# Patient Record
Sex: FEMALE | Race: BLACK OR AFRICAN AMERICAN | Employment: UNEMPLOYED | URBAN - METROPOLITAN AREA
[De-identification: names, ages, dates, MRNs, and addresses within clinical notes are randomized per-mention and may not be internally consistent; named-entity substitution may affect disease eponyms.]

---

## 2019-11-12 ENCOUNTER — OFFICE VISIT (OUTPATIENT)
Dept: ORTHOPEDIC SURGERY | Age: 54
End: 2019-11-12

## 2019-11-12 VITALS
OXYGEN SATURATION: 97 % | TEMPERATURE: 98.2 F | HEIGHT: 70 IN | SYSTOLIC BLOOD PRESSURE: 123 MMHG | WEIGHT: 193 LBS | DIASTOLIC BLOOD PRESSURE: 86 MMHG | RESPIRATION RATE: 16 BRPM | BODY MASS INDEX: 27.63 KG/M2 | HEART RATE: 66 BPM

## 2019-11-12 DIAGNOSIS — G89.29 CHRONIC RIGHT-SIDED LOW BACK PAIN WITH RIGHT-SIDED SCIATICA: Primary | ICD-10-CM

## 2019-11-12 DIAGNOSIS — M54.41 CHRONIC RIGHT-SIDED LOW BACK PAIN WITH RIGHT-SIDED SCIATICA: Primary | ICD-10-CM

## 2019-11-12 RX ORDER — METHYLPREDNISOLONE 4 MG/1
TABLET ORAL
Qty: 1 DOSE PACK | Refills: 0 | Status: SHIPPED | OUTPATIENT
Start: 2019-11-12 | End: 2019-11-12 | Stop reason: SDUPTHER

## 2019-11-12 RX ORDER — GUAIFENESIN 100 MG/5ML
81 LIQUID (ML) ORAL DAILY
COMMUNITY

## 2019-11-12 RX ORDER — MELOXICAM 15 MG/1
15 TABLET ORAL DAILY
Qty: 30 TAB | Refills: 1 | Status: SHIPPED | OUTPATIENT
Start: 2019-11-12

## 2019-11-12 RX ORDER — METHYLPREDNISOLONE 4 MG/1
TABLET ORAL
Qty: 1 DOSE PACK | Refills: 0 | Status: SHIPPED | OUTPATIENT
Start: 2019-11-12

## 2019-11-12 NOTE — PROGRESS NOTES
Chief complaint/History of Present Illness:  Chief Complaint   Patient presents with    Back Pain     lower back pain, pain down legs     HPI  Tiffany Mike is a  53 y.o.  female      HISTORY OF PRESENT ILLNESS:  The patient comes in today as a new patient referred by her PCP, Dr. Andre Arriola. She comes in today with low back pain with radiating right lower extremity pain from her buttock down to her knee for the past six months. She denies any injury; however, in 2008, she had a left tibial femur fracture and had an ORIF. She still has pain from that in that leg. It causes her to walk a little bit off, and she feels that has contributed to her back pain and right lower extremity pain. the pain has progressively gotten worse over the past six months. Her PCP did an x-ray. It showed a slight scoliotic curve. That was June 17, 2019. She states he also ordered an MRI, and she went to have it done, and but she found out she was claustrophobic. This was in October. She was unable to complete it. Her PCP has her on Gabapentin 300 mg twice a day and Tylenol No. 3 as needed for pain. She states her pain is increased with standing, lying, walking, lifting, and bending forward. She gets numbness in that right thigh. She has not tried any physical therapy or NSAIDs. PAST MEDICAL HISTORY:  Hyperlipidemia. PAST SURGICAL HISTORY:  Left ORIF lower extremity. She denies fever and bowel and bladder dysfunction. She rates her pain as a 6-8/10. She does not work. She is a nonsmoker. PHYSICAL EXAM:  Ms. Elbert Tello is a 76-year-old female. She is alert and oriented. She has a normal mood and affect. She has a full weightbearing, slightly antalgic gait using no assistive device. She has 4/5 strength of the bilateral lower extremities and negative straight leg raise. She has pain with hyperextension of the lumbar spine.        ASSESSMENT/PLAN:  This is a patient who has low back pain with radiating right buttock and leg pain down to her knee for the past six months. We are going to put her in some physical therapy. She is going to call and try to get her MRI rescheduled. I suggested that she get a Valium from her PCP prior to her MRI. If she is able to get the MRI completed, she can call up here, and we will have her followup with one of our physicians. If not, she can finish up the physical therapy and then we can order an MRI upon completion of that if it is still warranted. I have given her a Medrol Dosepak to take with food. I am also going to start her on Mobic 15 mg daily once she completes the Medrol Dosepak, if she still needs it for pain. We will see her back in two months or sooner if needed. Review of systems:    Past Medical History:   Diagnosis Date    GERD (gastroesophageal reflux disease)      Past Surgical History:   Procedure Laterality Date    HX ENDOSCOPY      HX ORTHOPAEDIC Left 2008    leg fx.      Social History     Socioeconomic History    Marital status: UNKNOWN     Spouse name: Not on file    Number of children: Not on file    Years of education: Not on file    Highest education level: Not on file   Occupational History    Not on file   Social Needs    Financial resource strain: Not on file    Food insecurity:     Worry: Not on file     Inability: Not on file    Transportation needs:     Medical: Not on file     Non-medical: Not on file   Tobacco Use    Smoking status: Never Smoker    Smokeless tobacco: Never Used   Substance and Sexual Activity    Alcohol use: Yes     Comment: occassionally    Drug use: No    Sexual activity: Not on file   Lifestyle    Physical activity:     Days per week: Not on file     Minutes per session: Not on file    Stress: Not on file   Relationships    Social connections:     Talks on phone: Not on file     Gets together: Not on file     Attends [employer] service: Not on file     Active member of club or organization: Not on file Attends meetings of clubs or organizations: Not on file     Relationship status: Not on file    Intimate partner violence:     Fear of current or ex partner: Not on file     Emotionally abused: Not on file     Physically abused: Not on file     Forced sexual activity: Not on file   Other Topics Concern    Not on file   Social History Narrative    Not on file     No family history on file. Physical Exam:  Visit Vitals  /86   Pulse 66   Temp 98.2 °F (36.8 °C) (Oral)   Resp 16   Ht 5' 10\" (1.778 m)   Wt 193 lb (87.5 kg)   SpO2 97%   BMI 27.69 kg/m²     Pain Scale: 7/10       has been . reviewed and is appropriate          Diagnoses and all orders for this visit:    1. Chronic right-sided low back pain with right-sided sciatica  -     meloxicam (MOBIC) 15 mg tablet; Take 1 Tab by mouth daily. Indications: joint damage causing pain and loss of function  -     REFERRAL TO PHYSICAL THERAPY  -     methylPREDNISolone (MEDROL, AMY,) 4 mg tablet; Per dose pack instructions            Follow-up and Dispositions    · Return in about 2 months (around 1/12/2020) for with NP.              We have informed Ophelia Shaikh to notify us for immediate appointment if she has any worsening neurogical symptoms or if an emergency situation presents, then call [phone]

## 2019-11-15 ENCOUNTER — HOSPITAL ENCOUNTER (OUTPATIENT)
Dept: PHYSICAL THERAPY | Age: 54
Discharge: HOME | End: 2019-11-15
Payer: MEDICAID

## 2019-11-15 PROCEDURE — 97162 PT EVAL MOD COMPLEX 30 MIN: CPT

## 2019-11-15 PROCEDURE — 97140 MANUAL THERAPY 1/> REGIONS: CPT

## 2019-11-15 PROCEDURE — 97110 THERAPEUTIC EXERCISES: CPT

## 2019-11-15 NOTE — PROGRESS NOTES
[facility]  [address] [facility], [address] [place] [address], [facility] [address]  [phone] fax      Plan of Care/ Statement of Necessity for Physical Therapy Services    Patient name: Deanna Stone Start of Care: 11/15/2019   Referral source: José Ramon NP : 1965    Medical Diagnosis: Lumbago with sciatica, right side [M54.41]  Payor: Amauri Mcdonald / Plan: Maritza Harada / Product Type: Managed Care Medicaid /  Onset Date: May 2019     Treatment Diagnosis: Low back and right LE pain    Prior Hospitalization: see medical history Provider#: [id number]   Medications: Verified on Patient summary List    Comorbidities: Arthritis, back pain, BMI over 30, HTN, 2008- Surgery due to left fractured femur with plates/screws   Prior Level of Function: Patient was able to navigate stairs with a reciprocal gait pattern without pain. Patient is ind with food prep and household management. The Plan of Care and following information is based on the information from the initial evaluation. Assessment/ key information: Patient presents with LBP that began May 2019 with an insidious onset. Patient thinks the onset of LBP began due to extensive walking on vacation in 2019. Patient has issues in her left leg (2008- fractured femur with plate/screws) and thinks that back pain began from altered mechanics during ambulation. Patient describes LBP as intermittent throbbing on right side of low back. Patient describes (B) LE pain as constant radiating/stinging/susan horse pain along anterior thigh to knee. Patient reports occasional tingling/numbness in (B) Anterior thigh, especially when patient is standing for a prolonged period of time. Patient is challenged with transferring after prolonged sitting, bed mobility, standing/walking 10-15 minutes, and sitting (approx 10 minutes).  Patient exhibits decreased core stabilization, poor glut activation, decreased functional mobility, and decreased hip strength. Patient had a negative slump and SLR test on the right. Patient is lacking 10 deg of knee extension (patient stated it has been like that since injury in 2008), and reports discomfort in right anterior thigh with quad activation. Patient has increased tenderness/tightness along right QL/ITB and reported numbness at right anterior thigh during palpation. Patient demonstrates potential to make functional gains within a reasonable time frame. Patient would benefit from skilled PT to address above deficits and assist with return to PLOF. Evaluation Complexity History MEDIUM  Complexity : 1-2 comorbidities / personal factors will impact the outcome/ POC ; Examination MEDIUM Complexity : 3 Standardized tests and measures addressing body structure, function, activity limitation and / or participation in recreation  ;Presentation MEDIUM Complexity : Evolving with changing characteristics  ; Clinical Decision Making MEDIUM Complexity : FOTO score of 26-74  Overall Complexity Rating: MEDIUM  Problem List: pain affecting function, decrease ROM, decrease strength, impaired gait/ balance, decrease ADL/ functional abilitiies, decrease activity tolerance, decrease flexibility/ joint mobility and decrease transfer abilities   Treatment Plan may include any combination of the following: Therapeutic exercise, Therapeutic activities, Neuromuscular re-education, Physical agent/modality, Gait/balance training, Manual therapy, Patient education, Functional mobility training and Stair training  Patient / Family readiness to learn indicated by: asking questions, trying to perform skills and interest  Persons(s) to be included in education: patient (P)  Barriers to Learning/Limitations: None  Patient Goal (s): some relief  Patient Self Reported Health Status: fair  Rehabilitation Potential: good    Short Term Goals: To be accomplished in 5 treatments:   1.  Patient will be ind and compliant with HEP 1-2x/day to increase ease with ADLs. Eval: HEP established    2. Patient will be able to perform 5 bridges with buttock clearance to increase ease with bed mobility   Eval: Patient unable to clear buttock with bridges. Long Term Goals: To be accomplished in 10 weeks:   1. Patient will improve FOTO to at least 67 to demonstrate improved function and increase quality of life. Eval: FOTO: 58   2. Patient will be able to perform 5 sit to stand transfers without UE support with pain no more then 2/10 to increase ease with car transfers. Eval:  Patient reports increased pain and difficulty with sit to stand transfers. 3. Patient will be able to stand/ambulate for 20 minutes to increase ease with grocery shopping. Eval:  Patient is challenged with standing/walking 10-15 minutes  Frequency / Duration: Patient to be seen 2 times per week for 10 treatments. Patient/ Caregiver education and instruction: Diagnosis, prognosis, exercises   [x]  Plan of care has been reviewed with MARILIA Landers, PT 11/15/2019 1:36 PM  ________________________________________________________________________    I certify that the above Therapy Services are being furnished while the patient is under my care. I agree with the treatment plan and certify that this therapy is necessary.     Physician's Signature:____________Date:_________TIME:________    [employer], Date and Time must be completed for valid certification **      Please sign and return to In . [address], [address] [place] [address], [address] [place] [address],[facility] [address]  [phone] fax

## 2019-11-15 NOTE — PROGRESS NOTES
PT DAILY TREATMENT NOTE/LUMBAR EVAL 10-18    Patient Name: Maya Swain  Date:11/15/2019  : 1965  [x]  Patient  Verified  Payor: Emmett Burn / Plan: [employer] / Product Type: Managed Care Medicaid /    In time:12:18  Out time:1:10  Total Treatment Time (min): 52  Visit #: 1 of 10    Treatment Area: Lumbago with sciatica, right side [M54.41]  SUBJECTIVE  Pain Level (0-10 scale): 7/10   []constant []intermittent []improving []worsening []no change since onset    Any medication changes, allergies to medications, adverse drug reactions, diagnosis change, or new procedure performed?: [x] No    [] Yes (see summary sheet for update)  Subjective functional status/changes:     PLOF: Patient was able to navigate stairs with a reciprocal gait pattern without pain. Patient is ind with food prep and household management. Limitations to PLOF: pain, limited mobility, decreased strength   Mechanism of Injury: Patient presents with LBP that began May 2019 with an insidious onset. Patient thinks the onset of LBP began due to doing extensive walking on vacation in 2019. Patient has issues in her left leg (2008- fractured femur with plate/screws) and thinks that back pain began from altered mechanics during ambulation. Current symptoms/Complaints: Patient describes LBP as intermittent throbbing on right side of low back. Patient describes (B) LE pain as constant radiating/stinging/susan horse pain along anterior thigh to knee. Patient reports occasional tingling/numbness in (B) Anterior thigh, especially when patient is standing for a prolonged period of time. Patient is challenged with transferring after prolong sitting, bed mobility, standing/walking 10-15 minutes, and sitting (approx 10 minutes). Previous Treatment/Compliance: Previous PT for left leg  PMHx/Surgical Hx: No previous back/hip/ankle/foot surg. No pacemaker no cardiac issues, no CA.   Work Hx: n/a  Living Situation: Stairs in home with rails. Uses   Pt Goals: \"some relief\"   Cognition: A & O x 2    Other:    OBJECTIVE/EXAMINATION    29 min [x]Eval                  []Re-Eval       15 min Therapeutic Exercise:  [x] See flow sheet : HEP creation and review    Rationale: increase ROM and increase strength to improve the patients ability to perform ADls safely    8 min Manual Therapy:  STM to Right QL and IASTM to right ITB    Rationale: decrease pain, increase ROM and increase tissue extensibility to reduce myofascial restriction and assist with return to proper length-tension of musculature to improve muscle activation and increase ease with ADLs. With   [] TE   [] TA   [] neuro   [] other: Patient Education: [x] Review HEP    [] Progressed/Changed HEP based on:   [] positioning   [] body mechanics   [] transfers   [] heat/ice application    [] other:      Other Objective/Functional Measures:     Physical Therapy Evaluation - Lumbar Spine ([facility])    General Health:  Red Flags Indicated? [] Yes    [] No  [] Yes [] No Recent weight change (If yes, due to dieting? [] Yes  [] No)   [] Yes [] No Weakness in legs during walking  [] Yes [] No Unremitting pain at night  [] Yes [] No Abdominal pain or problems  [] Yes [] No Rectal bleeding  [] Yes [] No Feet more cold or painful in cold weather  [] Yes [] No Menstrual irregularities  [] Yes [] No Blood or pain with urination  [] Yes [x] No Dysfunction of bowel or bladder  [] Yes [] No Recent illness within past 3 weeks (i.e, cold, flu)  [] Yes [] No Numbness/tingling in buttock/genitalia region    Past History/Treatments:     OBJECTIVE  Posture:  Latera trunk lean toward the right: [x] R    [] L     [] +  [] -  Kyphosis: [x] Increased [] Decreased   []  WNL  Lordosis:  [x] Increased [] Decreased   [] WNL  Pelvic symmetry: [] WNL    [] Other: unable to properly assist due to elevated pain     Gait:  [] Normal     [x] Abnormal: mild antalgic.  Patient lacking 10 deg of knee extension on the left. Patient stated its been like that since 2008. Active Movements: [] N/A   [] Too acute   [] Other:  ROM % AROM % PROM Comments:pain, area   Forward flexion 40-60 WFL  Utilized hands on legs to return to standing position    Extension 20-30 WFL     SB right 20-30 WFL     SB left 20-30 WFL     Rotation right 5-10 WFL     Rotation left 5-10 WFL       Dural Mobility:  SLR Sitting: [] R    [] L    [] +    [] -  @ (degrees):           Supine: [x] R    [x] L    [] +    [x] -  @ (degrees):   Slump Test: [x] R    [x] L    [] +    [x] -  @ (degrees):   Prone Knee Bend: [] R    [] L    [] +    [] -     Palpation  [] Min  [] Mod  [] Severe    Location: TTP at right QL and ITB and along anterior thigh. Patient reported numbness sensation from superior thigh to knee. [] Min  [] Mod  [] Severe    Location:  [] Min  [] Mod  [] Severe    Location:    Strength   L(0-5) R (0-5) N/T   Hip Flexion (L1,2) 3 3 []   Knee Extension (L3,4) 4- 4- []   Ankle Dorsiflexion (L4)   []   Great Toe Extension (L5)   []   Ankle Plantarflexion (S1)   []   Knee Flexion (S1,2) 4 4 []   Upper Abdominals   []   Lower Abdominals   []   Paraspinals   []   Back Rotators   []   Gluteus Toño   []   Other: Glut med 3- 3- []         Global Muscular Weakness:  Abdominals: poor    Other tests/comments:   Patient unable to perform a supine bridge with buttock clearance  Patient had increased difficulty performing bed mobility and reported a 7/10 pain  Patient was unable to tolerate prone position today. Unable to accurately assess pelvic alignment due to elevated pain  Patient reported increased pain at right lumbar region with passive SKTC at approximately 80deg of hip flexion. Patient reports anterior thigh discomfort with quad activation. Pain Level (0-10 scale) post treatment: 6/10    ASSESSMENT/Changes in Function: See POC. Patient reported a slight decrease in pain at end of the session.  When on the mat table patient indicated that the right LE symptoms and right sided low back pain was worse, but then when patient returned to a standing position stated her left anterior thigh pain was elevated. Therapist advised patient to perform HEP gently and to stop if pain increases. Patient will continue to benefit from skilled PT services to modify and progress therapeutic interventions, address functional mobility deficits, address ROM deficits, address strength deficits, analyze and address soft tissue restrictions, analyze and cue movement patterns, assess and modify postural abnormalities and address imbalance/dizziness to attain remaining goals.      [x]  See Plan of Care  []  See progress note/recertification  []  See Discharge Summary         Progress towards goals / Updated goals:  See POC    PLAN  []  Upgrade activities as tolerated     [x]  Continue plan of care  []  Update interventions per flow sheet       []  Discharge due to:_  []  Other:_      Naa Herrera, PT 11/15/2019  12:22 PM

## 2019-11-18 ENCOUNTER — HOSPITAL ENCOUNTER (OUTPATIENT)
Dept: PHYSICAL THERAPY | Age: 54
Discharge: HOME | End: 2019-11-18
Payer: MEDICAID

## 2019-11-18 PROCEDURE — 97110 THERAPEUTIC EXERCISES: CPT

## 2019-11-18 PROCEDURE — 97530 THERAPEUTIC ACTIVITIES: CPT

## 2019-11-18 NOTE — PROGRESS NOTES
PT DAILY TREATMENT NOTE 10-18    Patient Name: Roseanne Galicia  Date:2019  : 1965  [x]  Patient  Verified  Payor: Marie Parkinson / [place] [employer] / Product Type: Managed Care Medicaid /    In time:1044  Out time:1133  Total Treatment Time (min): 49  Visit #: 2 of 10    Medicare/BCBS Only   Total Timed Codes (min):  39 1:1 Treatment Time:  26       Treatment Area: Lumbago with sciatica, right side [M54.41]    SUBJECTIVE  Pain Level (0-10 scale): 6  Any medication changes, allergies to medications, adverse drug reactions, diagnosis change, or new procedure performed?: [x] No    [] Yes (see summary sheet for update)  Subjective functional status/changes:   [] No changes reported  Pt reports she has been doing HEP. OBJECTIVE    Modality rationale: decrease pain and increase tissue extensibility to improve the patients ability to complete functional task.    Min Type Additional Details    [] Estim:  []Unatt       []IFC  []Premod                        []Other:  []w/ice   []w/heat  Position:  Location:    [] Estim: []Att    []TENS instruct  []NMES                    []Other:  []w/US   []w/ice   []w/heat  Position:  Location:    []  Traction: [] Cervical       []Lumbar                       [] Prone          []Supine                       []Intermittent   []Continuous Lbs:  [] before manual  [] after manual    []  Ultrasound: []Continuous   [] Pulsed                           []1MHz   []3MHz W/cm2:  Location:    []  Iontophoresis with dexamethasone         Location: [] Take home patch   [] In clinic   10 []  Ice     [x]  heat  []  Ice massage  []  Laser   []  Anodyne Position: semi reclined  Location: right knee    []  Laser with stim  []  Other:  Position:  Location:    []  Vasopneumatic Device Pressure:       [] lo [] med [] hi   Temperature: [] lo [] med [] hi   [x] Skin assessment post-treatment:  [x]intact []redness- no adverse reaction    []redness  adverse reaction:      min []Eval []Re-Eval       30 min Therapeutic Exercise:  [x] See flow sheet :   Rationale: increase ROM, increase strength, improve coordination and increase proprioception to improve the patients ability to complete functional activity. 9 min Therapeutic Activity:  [x]  See flow sheet :   Rationale: increase ROM, increase strength, improve coordination and increase proprioception  to improve the patients ability to complete self care independently. With   [x] TE   [x] TA   [] neuro   [] other: Patient Education: [x] Review HEP    [] Progressed/Changed HEP based on:   [] positioning   [] body mechanics   [] transfers   [] heat/ice application    [] other:      Other Objective/Functional Measures:      Pain Level (0-10 scale) post treatment: 5    ASSESSMENT/Changes in Function: Pt completed 5' on bike to improve cardiovascular endurance and LE strength to increase abilities to complete functional task. Session focused on improving LE/core strength with good return demo. Tech helped to supervised some of therex. Verbal cues and demonstration required to use correct body mechanics. Encouraged pt to complete HEP daily to help to continue to progress PT interventions to improve patient's ability to complete functional and community task independently. At the conclusion of the session, pt reported decreased pain. Patient will continue to benefit from skilled PT services to modify and progress therapeutic interventions, address functional mobility deficits, address ROM deficits, address strength deficits, analyze and address soft tissue restrictions, analyze and cue movement patterns, analyze and modify body mechanics/ergonomics, assess and modify postural abnormalities and address imbalance/dizziness to attain remaining goals. [x]  See Plan of Care  []  See progress note/recertification  []  See Discharge Summary         Progress towards goals / Updated goals:  Short Term Goals:  To be accomplished in 5 treatments:               1. Patient will be ind and compliant with HEP 1-2x/day to increase ease with ADLs. Eval: HEP established     Current: completing 11/18               2. Patient will be able to perform 5 bridges with buttock clearance to increase ease with bed mobility               Eval: Patient unable to clear buttock with bridges. CURRENT: set 1: 4x, set 2: 5x MET 11/18  Long Term Goals: To be accomplished in 10 weeks:               1. Patient will improve FOTO to at least 67 to demonstrate improved function and increase quality of life. Eval: FOTO: 58               2. Patient will be able to perform 5 sit to stand transfers without UE support with pain no more then 2/10 to increase ease with car transfers. Eval:  Patient reports increased pain and difficulty with sit to stand transfers. 3. Patient will be able to stand/ambulate for 20 minutes to increase ease with grocery shopping.                Eval:  Patient is challenged with standing/walking 10-15 minutes    PLAN  [x]  Upgrade activities as tolerated     [x]  Continue plan of care  [x]  Update interventions per flow sheet       []  Discharge due to:_  []  Other:_      Jad Ambrosio 11/18/2019  10:46 AM    Future Appointments   Date Time Provider Hussein Miranda   11/22/2019  1:00 PM Kenneth Renner [facility]   11/26/2019  8:30 AM Melissa Myrick PTA [facility]   11/27/2019  2:00 PM Sailaja Lopez PT [facility]   12/2/2019  8:30 AM Melissa Myrick PTA [facility]   12/6/2019  9:00 AM Melissa Myrick PTA [facility]   12/9/2019 11:30 AM Sailaja Lopez PT [facility]   12/13/2019 10:00 AM Court Billy [facility]

## 2019-11-22 ENCOUNTER — HOSPITAL ENCOUNTER (OUTPATIENT)
Dept: PHYSICAL THERAPY | Age: 54
Discharge: HOME | End: 2019-11-22
Payer: MEDICAID

## 2019-11-22 PROCEDURE — 97140 MANUAL THERAPY 1/> REGIONS: CPT

## 2019-11-22 PROCEDURE — 97110 THERAPEUTIC EXERCISES: CPT

## 2019-11-22 NOTE — PROGRESS NOTES
PT DAILY TREATMENT NOTE 10-18    Patient Name: Adan Deal  Date:2019  : 1965  [x]  Patient  Verified  Payor: Nirmala Flores / Plan: [place] [employer] / Product Type: Managed Care Medicaid /    In time:1:00  Out time:1:54  Total Treatment Time (min): 47  Visit #: 3 of 10    Treatment Area: Lumbago with sciatica, right side [M54.41]    SUBJECTIVE  Pain Level (0-10 scale): 6  Any medication changes, allergies to medications, adverse drug reactions, diagnosis change, or new procedure performed?: [x] No    [] Yes (see summary sheet for update)  Subjective functional status/changes:   [] No changes reported  Pt reports feeling a little sore in her back after last visit    OBJECTIVE    Modality rationale: decrease pain to improve the patients ability to perform daily tasks   Min Type Additional Details    [] Estim:  []Unatt       []IFC  []Premod                        []Other:  []w/ice   []w/heat  Position:  Location:    [] Estim: []Att    []TENS instruct  []NMES                    []Other:  []w/US   []w/ice   []w/heat  Position:  Location:    []  Traction: [] Cervical       []Lumbar                       [] Prone          []Supine                       []Intermittent   []Continuous Lbs:  [] before manual  [] after manual    []  Ultrasound: []Continuous   [] Pulsed                           []1MHz   []3MHz W/cm2:  Location:    []  Iontophoresis with dexamethasone         Location: [] Take home patch   [] In clinic   10 []  Ice     [x]  heat  []  Ice massage  []  Laser   []  Anodyne Position: seated  Location: low back    []  Laser with stim  []  Other:  Position:  Location:    []  Vasopneumatic Device Pressure:       [] lo [] med [] hi   Temperature: [] lo [] med [] hi   [] Skin assessment post-treatment:  []intact []redness- no adverse reaction    []redness  adverse reaction:       36 min Therapeutic Exercise:  [x] See flow sheet :   Rationale: increase ROM and increase strength to improve the patients ability to perform ADLs      8 min Manual Therapy:  STM to Right QL, IT band, upper glutes, and piriformis   Rationale: decrease pain, increase ROM and increase tissue extensibility to reduce myofascial restriction and assist with return to proper length-tension of musculature to improve muscle activation and increase ease with ADLs. With   [] TE   [] TA   [] neuro   [] other: Patient Education: [x] Review HEP    [] Progressed/Changed HEP based on:   [] positioning   [] body mechanics   [] transfers   [] heat/ice application    [] other:      Other Objective/Functional Measures:    C/o central LBP throughout   Challenged with PPT    Pain Level (0-10 scale) post treatment: 6    ASSESSMENT/Changes in Function: Challenged with maintaining TA activation with PPT and PPT bridges. TTP @ upper glutes, piri, and along IT band. Notes central LBP throughout treatment. Patient will continue to benefit from skilled PT services to modify and progress therapeutic interventions, address functional mobility deficits, address ROM deficits, address strength deficits, analyze and address soft tissue restrictions, analyze and cue movement patterns, analyze and modify body mechanics/ergonomics and assess and modify postural abnormalities to attain remaining goals. []  See Plan of Care  []  See progress note/recertification  []  See Discharge Summary         Progress towards goals / Updated goals:  Short Term Goals: To be accomplished in 5 treatments:               1. Patient will be ind and compliant with HEP 1-2x/day to increase ease with ADLs.              Eval: HEP established                Current: completing 11/18               2. Patient will be able to perform 5 bridges with buttock clearance to increase ease with bed mobility               Eval: Patient unable to clear buttock with bridges.                CURRENT: set 1: 4x, set 2: 5x MET 11/18  Long Term Goals: To be accomplished in 10 weeks:               1.  Patient will improve FOTO to at least 67 to demonstrate improved function and increase quality of life.               Eval: FOTO: 62               2. Patient will be able to perform 5 sit to stand transfers without UE support with pain no more then 2/10 to increase ease with car transfers.                Eval:  Patient reports increased pain and difficulty with sit to stand transfers.               3. Patient will be able to stand/ambulate for 20 minutes to increase ease with grocery shopping.               Eval:  Patient is challenged with standing/walking 10-15 minutes    PLAN  []  Upgrade activities as tolerated     [x]  Continue plan of care  []  Update interventions per flow sheet       []  Discharge due to:_  []  Other:_      Jose R Dural, PTA 11/22/2019  1:02 PM    Future Appointments   Date Time Provider Hussein Miranda   11/26/2019  8:30 AM Brii Mcfadden PTA [facility]   11/27/2019  2:00 PM Penny Dykes, PT [facility]   12/2/2019  8:30 AM Laila Nguyen PTA [facility]   12/6/2019  9:00 AM Brii Mcfadden PTA [facility]   12/9/2019 11:30 AM Kev Gomez, PT [facility]   12/13/2019 10:00 AM Jaskaran Brar [facility]

## 2019-11-26 ENCOUNTER — HOSPITAL ENCOUNTER (OUTPATIENT)
Dept: PHYSICAL THERAPY | Age: 54
Discharge: HOME | End: 2019-11-26
Payer: MEDICAID

## 2019-11-26 PROCEDURE — 97110 THERAPEUTIC EXERCISES: CPT

## 2019-11-26 PROCEDURE — 97140 MANUAL THERAPY 1/> REGIONS: CPT

## 2019-11-26 NOTE — PROGRESS NOTES
PT DAILY TREATMENT NOTE 10-18    Patient Name: Zak Ricketts  Date:2019  : 1965  [x]  Patient  Verified  Payor: Lukas Grimaldo / Plan: [facility] MEDICAID / Product Type: Managed Care Medicaid /    In time: 8:35  Out time:9:30  Total Treatment Time (min): 51  Visit #: 4 of 10    Medicare/BCBS Only   Total Timed Codes (min):   1:1 Treatment Time:         Treatment Area: Lumbago with sciatica, right side [M54.41]    SUBJECTIVE  Pain Level (0-10 scale): 6  Any medication changes, allergies to medications, adverse drug reactions, diagnosis change, or new procedure performed?: [x] No    [] Yes (see summary sheet for update)  Subjective functional status/changes:   [] No changes reported  \"Cramoing  At my leg. \"    OBJECTIVE    Modality rationale: decrease edema, decrease inflammation, decrease pain and increase tissue extensibility to improve the patients ability to perform ADL   Min Type Additional Details    [] Estim:  []Unatt       []IFC  []Premod                        []Other:  []w/ice   []w/heat  Position:  Location:    [] Estim: []Att    []TENS instruct  []NMES                    []Other:  []w/US   []w/ice   []w/heat  Position:  Location:    []  Traction: [] Cervical       []Lumbar                       [] Prone          []Supine                       []Intermittent   []Continuous Lbs:  [] before manual  [] after manual    []  Ultrasound: []Continuous   [] Pulsed                           []1MHz   []3MHz W/cm2:  Location:    []  Iontophoresis with dexamethasone         Location: [] Take home patch   [] In clinic   10 []  Ice     [x]  heat  []  Ice massage  []  Laser   []  Anodyne Position:prone  Location:(B) TSP/LSP    []  Laser with stim  []  Other:  Position:  Location:    []  Vasopneumatic Device Pressure:       [] lo [] med [] hi   Temperature: [] lo [] med [] hi   [x] Skin assessment post-treatment:  [x]intact []redness- no adverse reaction    []redness  adverse reaction:      min []Eval []Re-Eval       33 min Therapeutic Exercise:  [x] See flow sheet :   Rationale: increase ROM and increase strength to improve the patients ability to perform ADL      min Therapeutic Activity:  []  See flow sheet :   Rationale: increase ROM and increase strength  to improve the patients ability to perform ADL       min Neuromuscular Re-education:  []  See flow sheet :   Rationale:   to improve the patients ability to    8 min Manual Therapy:  P/A mob 3 (B) TSP/LSP   Rationale: decrease pain, increase ROM, increase tissue extensibility and decrease edema  to perform ADL      min Gait Training:  ___ feet with ___ device on level surfaces with ___ level of assist   Rationale: With   [x] TE   [] TA   [] neuro   [] other: Patient Education: [x] Review HEP    [] Progressed/Changed HEP based on:   [] positioning   [] body mechanics   [] transfers   [] heat/ice application    [] other:      Other Objective/Functional Measures:     Pain Level (0-10 scale) post treatment: 5    ASSESSMENT/Changes in Function: pt exhibits tightness (B) TSP/LSP. Pt  Was  Able to perform there ex fairly well with mod pain. Discussed with pt on importance of drinking  Water  During the day and  Taking vitamins. Patient will continue to benefit from skilled PT services to address functional mobility deficits, address ROM deficits, address strength deficits, analyze and address soft tissue restrictions and analyze and cue movement patterns to attain remaining goals. [x]  See Plan of Care  []  See progress note/recertification  []  See Discharge Summary         Progress towards goals / Updated goals:  Short Term Goals: To be accomplished in 5 treatments:               1. Patient will be ind and compliant with HEP 1-2x/day to increase ease with ADLs.                Ramona Felix              [id number]: completing 11/18               2. Patient will be able to perform 5 bridges with buttock clearance to increase ease with bed mobility               Eval: Patient unable to clear buttock with bridges.               CURRENT: set 1: 4x, set 2: 5x MET 11/18  Long Term Goals: To be accomplished in 10 weeks:               1. Patient will improve FOTO to at least 67 to demonstrate improved function and increase quality of life.               Eval: FOTO: 62               2. Patient will be able to perform 5 sit to stand transfers without UE support with pain no more then 2/10 to increase ease with car transfers.                Eval:  Patient reports increased pain and difficulty with sit to stand transfers. progressing 11/26/19               3. Patient will be able to stand/ambulate for 20 minutes to increase ease with grocery shopping.               Eval:  Patient is challenged with standing/walking 10-15 minutes    PLAN  []  Upgrade activities as tolerated     []  Continue plan of care  []  Update interventions per flow sheet       []  Discharge due to:_  []  Other:_      Janny Williamson, MARILIA 11/26/2019  8:48 AM    Future Appointments   Date Time Provider Hussein Miranda   11/27/2019  2:00 PM Joanna Ojeda PT [facility]   12/2/2019  8:30 AM Laila Nguyen PTA [facility]   12/6/2019  9:00 AM Laila Nguyen PTA [facility]   12/9/2019 11:30 AM Laila Nguyen PTA [facility]   12/13/2019 10:00 AM Cristine Cohen [facility]

## 2019-11-27 ENCOUNTER — APPOINTMENT (OUTPATIENT)
Dept: PHYSICAL THERAPY | Age: 54
End: 2019-11-27
Payer: MEDICAID

## 2019-12-06 ENCOUNTER — HOSPITAL ENCOUNTER (OUTPATIENT)
Dept: PHYSICAL THERAPY | Age: 54
Discharge: HOME | End: 2019-12-06
Payer: MEDICAID

## 2019-12-06 PROCEDURE — 97110 THERAPEUTIC EXERCISES: CPT

## 2019-12-06 PROCEDURE — 97140 MANUAL THERAPY 1/> REGIONS: CPT

## 2019-12-06 NOTE — PROGRESS NOTES
PT DAILY TREATMENT NOTE 10-18    Patient Name: Macie Marshall  Date:2019  : 1965  [x]  Patient  Verified  Payor: Emmie Monteior / Plan: [facility] MEDICAID / Product Type: Managed Care Medicaid /    In time: 9:06  Out time:10:03  Total Treatment Time (min):  43  Visit #: 5 of 10    Medicare/BCBS Only   Total Timed Codes (min):   1:1 Treatment Time:         Treatment Area: Lumbago with sciatica, right side [M54.41]    SUBJECTIVE  Pain Level (0-10 scale): 5-6  Any medication changes, allergies to medications, adverse drug reactions, diagnosis change, or new procedure performed?: [x] No    [] Yes (see summary sheet for update)  Subjective functional status/changes:   [] No changes reported  \"Im sore. \"    OBJECTIVE    Modality rationale: decrease edema, decrease inflammation, decrease pain and increase tissue extensibility to improve the patients ability to perform ADL    Min Type Additional Details    [] Estim:  []Unatt       []IFC  []Premod                        []Other:  []w/ice   []w/heat  Position:  Location:    [] Estim: []Att    []TENS instruct  []NMES                    []Other:  []w/US   []w/ice   []w/heat  Position:  Location:    []  Traction: [] Cervical       []Lumbar                       [] Prone          []Supine                       []Intermittent   []Continuous Lbs:  [] before manual  [] after manual    []  Ultrasound: []Continuous   [] Pulsed                           []1MHz   []3MHz W/cm2:  Location:    []  Iontophoresis with dexamethasone         Location: [] Take home patch   [] In clinic   10 [x]  Ice   post  []  heat  []  Ice massage  []  Laser   []  Anodyne Position:prone  Location:(B) TSP/LSP    []  Laser with stim  []  Other:  Position:  Location:    []  Vasopneumatic Device Pressure:       [] lo [] med [] hi   Temperature: [] lo [] med [] hi   [x] Skin assessment post-treatment:  [x]intact []redness- no adverse reaction    []redness  adverse reaction:      min []Eval []Re-Eval       33 min Therapeutic Exercise:  [x] See flow sheet :   Rationale: increase ROM and increase strength to improve the patients ability to perform ADL      min Therapeutic Activity:  []  See flow sheet :   Rationale: increase ROM and increase strength  to improve the patients ability to perform ADL      min Neuromuscular Re-education:  []  See flow sheet :   Rationale:   to improve the patients ability to     10 min Manual Therapy:  P/A mob 3 TSP/LSP prone   Rationale: decrease pain, increase ROM, increase tissue extensibility and decrease edema  to perform ADL      min Gait Training:  ___ feet with ___ device on level surfaces with ___ level of assist   Rationale: With   [x] TE   [] TA   [] neuro   [] other: Patient Education: [x] Review HEP    [] Progressed/Changed HEP based on:   [] positioning   [] body mechanics   [] transfers   [] heat/ice application    [] other:      Other Objective/Functional Measures:      Pain Level (0-10 scale) post treatment: 5    ASSESSMENT/Changes in Function: pt was unable to perform bridges ad modified with glute squeeze. Demo tightness TSP during manual and  Tenderness at left  Quadratus. Discussed with pt on importance of using CP verses MH  For pain. Continued with mod  Pain. Patient will continue to benefit from skilled PT services to address functional mobility deficits, address ROM deficits, address strength deficits, analyze and address soft tissue restrictions and analyze and cue movement patterns to attain remaining goals. [x]  See Plan of Care  []  See progress note/recertification  []  See Discharge Summary         Progress towards goals / Updated goals:  Short Term Goals: To be accomplished in 5 treatments:               1. Patient will be ind and compliant with HEP 1-2x/day to increase ease with ADLs.                EvalThedford Danger              [id number]: completing 11/18               2. Patient will be able to perform 5 bridges with buttock clearance to increase ease with bed mobility               Eval: Patient unable to clear buttock with bridges.               CURRENT: set 1: 4x, set 2: 5x MET 11/18  Long Term Goals: To be accomplished in 10 weeks:               1. Patient will improve FOTO to at least 67 to demonstrate improved function and increase quality of life.               Eval: FOTO: 62               2. Patient will be able to perform 5 sit to stand transfers without UE support with pain no more then 2/10 to increase ease with car transfers.                Eval:  Patient reports increased pain and difficulty with sit to stand transfers. progressing 11/26/19               3. Patient will be able to stand/ambulate for 20 minutes to increase ease with grocery shopping.               Eval:  Patient is challenged with standing/walking 10-15 minutes    PLAN  []  Upgrade activities as tolerated     [x]  Continue plan of care  []  Update interventions per flow sheet       []  Discharge due to:_  [x]  Other:_  Trial ESTIM NV    Laila Nguyen, MARILIA 12/6/2019  9:10 AM    Future Appointments   Date Time Provider Hussein Miranda   12/9/2019 11:30 AM Macario Egan PT [facility]   12/13/2019 10:00 AM Alfonza Mortimer [facility]

## 2019-12-09 ENCOUNTER — APPOINTMENT (OUTPATIENT)
Dept: PHYSICAL THERAPY | Age: 54
End: 2019-12-09
Payer: MEDICAID

## 2019-12-11 ENCOUNTER — HOSPITAL ENCOUNTER (OUTPATIENT)
Dept: PHYSICAL THERAPY | Age: 54
Discharge: HOME | End: 2019-12-11
Payer: MEDICAID

## 2019-12-11 PROCEDURE — 97140 MANUAL THERAPY 1/> REGIONS: CPT

## 2019-12-11 PROCEDURE — 97110 THERAPEUTIC EXERCISES: CPT

## 2019-12-11 NOTE — PROGRESS NOTES
PT DAILY TREATMENT NOTE 10-18    Patient Name: Roseanne Galicia  Date:2019  : 1965  [x]  Patient  Verified  Payor: [place] / Plan: [email or web address] / Product Type: Managed Care Medicaid /    In time:829  Out time:919  Total Treatment Time (min): 50  Visit #: 6 of 10        Treatment Area: Lumbago with sciatica, right side [M54.41]    SUBJECTIVE  Pain Level (0-10 scale): 5  Any medication changes, allergies to medications, adverse drug reactions, diagnosis change, or new procedure performed?: [x] No    [] Yes (see summary sheet for update)  Subjective functional status/changes:   [] No changes reported  Patient reported left sided back pain upon arrival    OBJECTIVE    Modality rationale: decrease pain and increase tissue extensibility to improve the patients ability to perform ADLs   Min Type Additional Details    [] Estim:  []Unatt       []IFC  []Premod                        []Other:  []w/ice   []w/heat  Position:  Location:    [] Estim: []Att    []TENS instruct  []NMES                    []Other:  []w/US   []w/ice   []w/heat  Position:  Location:    []  Traction: [] Cervical       []Lumbar                       [] Prone          []Supine                       []Intermittent   []Continuous Lbs:  [] before manual  [] after manual    []  Ultrasound: []Continuous   [] Pulsed                           []1MHz   []3MHz W/cm2:  Location:    []  Iontophoresis with dexamethasone         Location: [] Take home patch   [] In clinic   10 []  Ice     [x]  heat  []  Ice massage  []  Laser   []  Anodyne Position:  Location:    []  Laser with stim  []  Other:  Position:  Location:    []  Vasopneumatic Device Pressure:       [] lo [] med [] hi   Temperature: [] lo [] med [] hi   [] Skin assessment post-treatment:  []intact []redness- no adverse reaction    []redness  adverse reaction:       30 min Therapeutic Exercise:  [x] See flow sheet :   Rationale: increase ROM and increase strength to improve the patients ability to perform ADLs      10 min Manual Therapy:   P/A mob 3 TSP/LSP prone, STM/DTM left QL and piriformis. Rationale: decrease pain, increase ROM and increase tissue extensibility to perform ADLs              With   [] TE   [] TA   [] neuro   [] other: Patient Education: [x] Review HEP    [] Progressed/Changed HEP based on:   [] positioning   [] body mechanics   [] transfers   [] heat/ice application    [] other:      Other Objective/Functional Measures:      Pain Level (0-10 scale) post treatment: 5    ASSESSMENT/Changes in Function: Patient continues to have pain with bed mobility and transitions such as sit<>stand. Patient instructed to continue with HEP especially QL stretching on left to help decrease stiffness and pain with movement. Unable to trial Estim today but patient will benefit from that treatment to decrease pain enough to progress therex. Trial NV. Patient will continue to benefit from skilled PT services to modify and progress therapeutic interventions, address functional mobility deficits, address ROM deficits, address strength deficits, analyze and address soft tissue restrictions and analyze and cue movement patterns to attain remaining goals. [x]  See Plan of Care  []  See progress note/recertification  []  See Discharge Summary         Progress towards goals / Updated goals:  Short Term Goals: To be accomplished in 5 treatments:               1. Patient will be ind and compliant with HEP 1-2x/day to increase ease with ADLs.              EvalTownsend eNss              [id number]: completing 11/18               2. Patient will be able to perform 5 bridges with buttock clearance to increase ease with bed mobility               Eval: Patient unable to clear buttock with bridges.               CURRENT: set 1: 4x, set 2: 5x MET 11/18  Long Term Goals: To be accomplished in 10 weeks:               1.  Patient will improve FOTO to at least 67 to demonstrate improved function and increase quality of life.               Eval: FOTO: 62               2. Patient will be able to perform 5 sit to stand transfers without UE support with pain no more then 2/10 to increase ease with car transfers.                Eval:  Patient reports increased pain and difficulty with sit to stand transfers. progressing 11/26/19               3. Patient will be able to stand/ambulate for 20 minutes to increase ease with grocery shopping.               Eval:  Patient is challenged with standing/walking 10-15 minutes       PLAN  []  Upgrade activities as tolerated     [x]  Continue plan of care  []  Update interventions per flow sheet       []  Discharge due to:_  [x]  Other:_    Trial ESTIM NV    Jami Albarran PTA 12/11/2019  9:10 AM    Future Appointments   Date Time Provider Hussein Miranda   12/13/2019 10:00 AM Onetha Boast

## 2019-12-13 ENCOUNTER — HOSPITAL ENCOUNTER (OUTPATIENT)
Dept: PHYSICAL THERAPY | Age: 54
Discharge: HOME | End: 2019-12-13
Payer: MEDICAID

## 2019-12-13 PROCEDURE — 97530 THERAPEUTIC ACTIVITIES: CPT

## 2019-12-13 PROCEDURE — 97110 THERAPEUTIC EXERCISES: CPT

## 2019-12-13 NOTE — PROGRESS NOTES
PT DAILY TREATMENT NOTE 10-18    Patient Name: Elida Soto  Date:2019  : 1965  [x]  Patient  Verified  Payor: Elizabeth Gamble / Plan: [employer] / Product Type: Managed Care Medicaid /    In time:1000  Out time:1050  Total Treatment Time (min): 50  Visit #: 7 of 10    Treatment Area: Lumbago with sciatica, right side [M54.41]    SUBJECTIVE  Pain Level (0-10 scale): 5  Any medication changes, allergies to medications, adverse drug reactions, diagnosis change, or new procedure performed?: [x] No    [] Yes (see summary sheet for update)  Subjective functional status/changes:   [] No changes reported  Pt reports she is doing HEP. She still has problems with prolonged sitting and getting up. She is not sleeping well. OBJECTIVE    Modality rationale: decrease pain and increase tissue extensibility to improve the patients ability to complete functional task.    Min Type Additional Details    [] Estim:  []Unatt       []IFC  []Premod                        []Other:  []w/ice   []w/heat  Position:  Location:    [] Estim: []Att    []TENS instruct  []NMES                    []Other:  []w/US   []w/ice   []w/heat  Position:  Location:    []  Traction: [] Cervical       []Lumbar                       [] Prone          []Supine                       []Intermittent   []Continuous Lbs:  [] before manual  [] after manual    []  Ultrasound: []Continuous   [] Pulsed                           []1MHz   []3MHz W/cm2:  Location:    []  Iontophoresis with dexamethasone         Location: [] Take home patch   [] In clinic   10 []  Ice     [x]  heat  []  Ice massage  []  Laser   []  Anodyne Position: prone  Location: lumbar    []  Laser with stim  []  Other:  Position:  Location:    []  Vasopneumatic Device Pressure:       [] lo [] med [] hi   Temperature: [] lo [] med [] hi   [x] Skin assessment post-treatment:  [x]intact []redness- no adverse reaction    []redness  adverse reaction:     25 min Therapeutic Exercise:  [x] See flow sheet :   Rationale: increase ROM, increase strength and patient education on updated HEP to improve the patients ability to complete functional task. 15 min Therapeutic Activity:  [x]  See flow sheet :   Rationale: increase ROM, increase strength, improve coordination, increase proprioception and sit to stand transfers  to improve the patients ability to get out of chair safely at home. With   [x] TE   [x] TA   [] neuro   [] other: Patient Education: [x] Review HEP    [] Progressed/Changed HEP based on:   [] positioning   [] body mechanics   [] transfers   [] heat/ice application    [] other:      Other Objective/Functional Measures:      Pain Level (0-10 scale) post treatment: 5    ASSESSMENT/Changes in Function: Pt completed 5' on bike to improve cardiovascular endurance and LE strength to increase abilities to complete functional task. Tech helped to supervise some of the exercises. Progressed standing exercises to increase standing tolerance, endurance, and LE strength with good return demo. Verbal cues and demonstration required to use correct body mechanics. Updated HEP and written hardcopy printed. Encouraged pt to complete HEP daily to help to continue to progress PT interventions to improve patient's ability to complete functional and community task independently. At the conclusion of the session, pt reported no increase in pain after MHP for pain management. Patient will continue to benefit from skilled PT services to modify and progress therapeutic interventions, address functional mobility deficits, address ROM deficits, address strength deficits, analyze and address soft tissue restrictions, analyze and cue movement patterns, analyze and modify body mechanics/ergonomics, assess and modify postural abnormalities and address imbalance/dizziness to attain remaining goals.      [x]  See Plan of Care  []  See progress note/recertification  []  See Discharge Summary         Progress towards goals / Updated goals:  Short Term Goals: To be accomplished in 5 treatments:               1. Patient will be ind and compliant with HEP 1-2x/day to increase ease with ADLs.              Zeferino Dawson              [id number]: completing 11/18               2. Patient will be able to perform 5 bridges with buttock clearance to increase ease with bed mobility               Eval: Patient unable to clear buttock with bridges.               CURRENT: set 1: 4x, set 2: 5x MET 11/18  Long Term Goals: To be accomplished in 10 weeks:               1. Patient will improve FOTO to at least 67 to demonstrate improved function and increase quality of life.               Eval: FOTO: 62               2. Patient will be able to perform 5 sit to stand transfers without UE support with pain no more then 2/10 to increase ease with car transfers.                Eval:  Patient reports increased pain and difficulty with sit to stand transfers. progressing 11/26/19   CURRENT: 2x10 with UE at 22 inch plinth height 12/13               3. Patient will be able to stand/ambulate for 20 minutes to increase ease with grocery shopping.               Eval:  Patient is challenged with standing/walking 10-15 minutes       PLAN  [x]  Upgrade activities as tolerated     [x]  Continue plan of care  [x]  Update interventions per flow sheet       []  Discharge due to:_  []  Other:_      Clara Baird 12/13/2019  10:05 AM    No future appointments.

## 2019-12-27 NOTE — PROGRESS NOTES
[facility] [address], [address] [place] [address], [address]  [phone] fax    Discharge Summary    Patient name: Macie Marshall     Start of Care: 11/15/2019  Referral source: Vargas Manzo NP    : 1965  Medical/Treatment Diagnosis: Lumbago with sciatica, right side [M54.41]  Payor: [employer] / Plan: Pranav Rojas / Product Type: Managed Care Medicaid /        Onset Date:May 2019   Prior Hospitalization: see medical history   Provider#: [id number]  Comorbidities: Arthritis, back pain, BMI over 30, HTN, 2008- Surgery due to left fractured femur with plates/screws  Prior Level of Function:Patient was able to navigate stairs with a reciprocal gait pattern without pain. Patient is ind with food prep and household management.   Medications: Verified on Patient Summary List    Visits from Start of Care: 7    Missed Visits: 3  Reporting Period : 11/15/19 to 19      Summary of Care: Pt has completed 10 skilled PT interventions to address Right LE and low back pain. Due to lack of attendance, pt is discharged from PT. Unable to fully reassess as planned. Discharge recommendations are to complete HEP I and follow-up with physician as needed. Goal: Patient will be ind and compliant with HEP 1-2x/day to increase ease with ADLs. Status at last note/certification: compliance  Status at discharge: met    Goal:  Patient will be able to perform 5 bridges with buttock clearance to increase ease with bed mobility  Status at last note/certification: 5x MET   Status at discharge: met    Goal: Patient will improve FOTO to at least 67 to demonstrate improved function and increase quality of life. Status at last note/certification: Eval: FOTO: 58  Status at discharge: not met    Goal: . Patient will be able to perform 5 sit to stand transfers without UE support with pain no more then 2/10 to increase ease with car transfers.    Status at last note/certification: 5R33 with UE at 22 inch plinth height   Status at discharge: not met    Goal: Patient will be able to stand/ambulate for 20 minutes to increase ease with grocery shopping. Status at last note/certification: Patient is challenged with standing/walking 10-15 minutes  Status at discharge: not met      ASSESSMENT/RECOMMENDATIONS:  []Discontinue therapy progressing towards or have reached established goals  []Discontinue therapy due to lack of appreciable progress towards goals  [x]Discontinue therapy due to lack of attendance or compliance  []Other: Thank you for this referral.     Elida Machado 12/27/2019 7:37 AM    NOTE TO PHYSICIAN:  Please complete the following and fax to: [facility] at [facility] at [phone]  . Retain this original for your records. If you are unable to process this request in   24 hours, please contact our office.      [] I have read the above report and request that my patient continue therapy with the following changes/special instructions:  [] I have read the above report and request that my patient be discharged from therapy    Physician's Signature:____________Date:_________TIME:________    ** Signature, Date and Time must be completed for valid certification **

## 2019-12-31 ENCOUNTER — APPOINTMENT (OUTPATIENT)
Dept: PHYSICAL THERAPY | Age: 54
End: 2019-12-31
Payer: MEDICAID

## 2020-02-11 ENCOUNTER — HOSPITAL ENCOUNTER (EMERGENCY)
Age: 55
Discharge: HOME | End: 2020-02-11
Attending: EMERGENCY MEDICINE
Payer: COMMERCIAL

## 2020-02-11 VITALS
TEMPERATURE: 98.7 F | WEIGHT: 200 LBS | RESPIRATION RATE: 16 BRPM | DIASTOLIC BLOOD PRESSURE: 78 MMHG | OXYGEN SATURATION: 96 % | HEART RATE: 100 BPM | BODY MASS INDEX: 28.7 KG/M2 | SYSTOLIC BLOOD PRESSURE: 130 MMHG

## 2020-02-11 DIAGNOSIS — R56.9 SEIZURE (HCC): Primary | ICD-10-CM

## 2020-02-11 LAB
ALBUMIN SERPL-MCNC: 3.7 G/DL (ref 3.4–5)
ALBUMIN/GLOB SERPL: 1 {RATIO} (ref 0.8–1.7)
ALP SERPL-CCNC: 140 U/L (ref 45–117)
ALT SERPL-CCNC: 22 U/L (ref 13–56)
ANION GAP SERPL CALC-SCNC: 13 MMOL/L (ref 3–18)
APPEARANCE UR: CLEAR
AST SERPL-CCNC: 15 U/L (ref 10–38)
BACTERIA URNS QL MICRO: ABNORMAL /HPF
BASOPHILS # BLD: 0 K/UL (ref 0–0.1)
BASOPHILS NFR BLD: 0 % (ref 0–2)
BILIRUB SERPL-MCNC: 0.6 MG/DL (ref 0.2–1)
BILIRUB UR QL: NEGATIVE
BUN SERPL-MCNC: 5 MG/DL (ref 7–18)
BUN/CREAT SERPL: 7 (ref 12–20)
CALCIUM SERPL-MCNC: 8.9 MG/DL (ref 8.5–10.1)
CHLORIDE SERPL-SCNC: 107 MMOL/L (ref 100–111)
CO2 SERPL-SCNC: 21 MMOL/L (ref 21–32)
COLOR UR: YELLOW
CREAT SERPL-MCNC: 0.76 MG/DL (ref 0.6–1.3)
DIFFERENTIAL METHOD BLD: ABNORMAL
EOSINOPHIL # BLD: 0.2 K/UL (ref 0–0.4)
EOSINOPHIL NFR BLD: 2 % (ref 0–5)
EPITH CASTS URNS QL MICRO: ABNORMAL /LPF (ref 0–5)
ERYTHROCYTE [DISTWIDTH] IN BLOOD BY AUTOMATED COUNT: 14.6 % (ref 11.6–14.5)
GLOBULIN SER CALC-MCNC: 3.6 G/DL (ref 2–4)
GLUCOSE SERPL-MCNC: 141 MG/DL (ref 74–99)
GLUCOSE UR STRIP.AUTO-MCNC: NEGATIVE MG/DL
HCT VFR BLD AUTO: 41.1 % (ref 35–45)
HGB BLD-MCNC: 13.2 G/DL (ref 12–16)
HGB UR QL STRIP: NEGATIVE
KETONES UR QL STRIP.AUTO: NEGATIVE MG/DL
LEUKOCYTE ESTERASE UR QL STRIP.AUTO: ABNORMAL
LYMPHOCYTES # BLD: 4.6 K/UL (ref 0.9–3.6)
LYMPHOCYTES NFR BLD: 59 % (ref 21–52)
MCH RBC QN AUTO: 25.9 PG (ref 24–34)
MCHC RBC AUTO-ENTMCNC: 32.1 G/DL (ref 31–37)
MCV RBC AUTO: 80.6 FL (ref 74–97)
MONOCYTES # BLD: 0.8 K/UL (ref 0.05–1.2)
MONOCYTES NFR BLD: 10 % (ref 3–10)
MUCOUS THREADS URNS QL MICRO: ABNORMAL /LPF
NEUTS SEG # BLD: 2.3 K/UL (ref 1.8–8)
NEUTS SEG NFR BLD: 29 % (ref 40–73)
NITRITE UR QL STRIP.AUTO: NEGATIVE
PH UR STRIP: 5 [PH] (ref 5–8)
PLATELET # BLD AUTO: 344 K/UL (ref 135–420)
PMV BLD AUTO: 10.3 FL (ref 9.2–11.8)
POTASSIUM SERPL-SCNC: 3.4 MMOL/L (ref 3.5–5.5)
PROT SERPL-MCNC: 7.3 G/DL (ref 6.4–8.2)
PROT UR STRIP-MCNC: NEGATIVE MG/DL
RBC # BLD AUTO: 5.1 M/UL (ref 4.2–5.3)
RBC #/AREA URNS HPF: ABNORMAL /HPF (ref 0–5)
SODIUM SERPL-SCNC: 141 MMOL/L (ref 136–145)
SP GR UR REFRACTOMETRY: 1.01 (ref 1–1.03)
UROBILINOGEN UR QL STRIP.AUTO: 0.2 EU/DL (ref 0.2–1)
WBC # BLD AUTO: 7.8 K/UL (ref 4.6–13.2)
WBC URNS QL MICRO: ABNORMAL /HPF (ref 0–4)

## 2020-02-11 PROCEDURE — 85025 COMPLETE CBC W/AUTO DIFF WBC: CPT

## 2020-02-11 PROCEDURE — 99284 EMERGENCY DEPT VISIT MOD MDM: CPT

## 2020-02-11 PROCEDURE — 87086 URINE CULTURE/COLONY COUNT: CPT

## 2020-02-11 PROCEDURE — 81001 URINALYSIS AUTO W/SCOPE: CPT

## 2020-02-11 PROCEDURE — 80053 COMPREHEN METABOLIC PANEL: CPT

## 2020-02-11 NOTE — DISCHARGE INSTRUCTIONS
Patient Education        Seizure: Care Instructions  Your Care Instructions    Seizures are caused by abnormal patterns of electrical signals in the brain. They are different for each person. Seizures can affect movement, speech, vision, or awareness. Some people have only slight shaking of a hand and do not pass out. Other people may pass out and have violent shaking of the whole body. Some people appear to stare into space. They are awake, but they can't respond normally. Later, they may not remember what happened. You may need tests to identify the type and cause of the seizures. A seizure may occur only once, or you may have them more than one time. Taking medicines as directed and following up with your doctor may help keep you from having more seizures. The doctor has checked you carefully, but problems can develop later. If you notice any problems or new symptoms, get medical treatment right away. Follow-up care is a key part of your treatment and safety. Be sure to make and go to all appointments, and call your doctor if you are having problems. It's also a good idea to know your test results and keep a list of the medicines you take. How can you care for yourself at home? · Be safe with medicines. Take your medicines exactly as prescribed. Call your doctor if you think you are having a problem with your medicine. · Do not do any activity that could be dangerous to you or others until your doctor says it is safe to do so. For example, do not drive a car, operate machinery, swim, or climb ladders. · Be sure that anyone treating you for any health problem knows that you have had a seizure and what medicines you are taking for it. · Identify and avoid things that may make you more likely to have a seizure. These may include lack of sleep, alcohol or drug use, stress, or not eating. · Make sure you go to your follow-up appointment. When should you call for help?   Call 911 anytime you think you may need emergency care. For example, call if:    · You have another seizure.     · You have more than one seizure in 24 hours.     · You have new symptoms, such as trouble walking, speaking, or thinking clearly.    Call your doctor now or seek immediate medical care if:    · You are not acting normally.    Watch closely for changes in your health, and be sure to contact your doctor if you have any problems. Where can you learn more? Go to http://bertha-nidia.info/. Enter G280 in the search box to learn more about \"Seizure: Care Instructions. \"  Current as of: March 28, 2019  Content Version: 12.2  © 1532-9569 [employer], [employer]. Care instructions adapted under license by [employer] (which disclaims liability or warranty for this information). If you have questions about a medical condition or this instruction, always ask your healthcare professional. [address] any warranty or liability for your use of this information.

## 2020-02-11 NOTE — ED PROVIDER NOTES
EMERGENCY DEPARTMENT HISTORY AND PHYSICAL EXAM    Date: 2/11/2020  Patient Name: Vicente Gonzales    History of Presenting Illness     Chief Complaint   Patient presents with    Seizure     s/p sz witnessed @ home with family, last sz 12/2019 hospitalize x1 week SNG s/p CT, MRI, EEG         History Provided By: Patient and Patient's Son    Additional History (Context): Vicente Gonzales is a 53 y.o. female with hypertension and hyperlipidemia who presents with seizure witnessed by her son this morning. He that she was just snoring loudly walked in she was unresponsive shaking with her tongue hanging out. He says it last up to 5 minutes. She then had a postictal.  Until she arrived here in the emergency department. She was not sure if she was having a dream or not and it was only when the nurse touched her that she realized it was for real.  She had a seizure once before back in December and has her first neurology appointment this morning at 10:00 [facility]. She is not on an anticonvulsant. Denies any numbness weakness. She feels at baseline now. She has not been driving. Drinks infrequently. Denies tobacco.    PCP: Reida Lombard, MD    Current Outpatient Medications   Medication Sig Dispense Refill    GABAPENTIN PO Take  by mouth.  acetaminophen with codeine (TYLENOL-CODEINE #3 PO) Take  by mouth.  aspirin 81 mg chewable tablet Take 81 mg by mouth daily.  meloxicam (MOBIC) 15 mg tablet Take 1 Tab by mouth daily. Indications: joint damage causing pain and loss of function 30 Tab 1    methylPREDNISolone (MEDROL, AMY,) 4 mg tablet Per dose pack instructions 1 Dose Pack 0    simvastatin (ZOCOR) 20 mg tablet Take 20 mg by mouth nightly.  multivitamin (ONE A DAY) tablet Take 1 tablet by mouth daily.  DOCOSAHEXANOIC ACID/EPA (FISH OIL PO) Take  by mouth daily.          Past History     Past Medical History:  Past Medical History:   Diagnosis Date    GERD (gastroesophageal reflux disease) Past Surgical History:  Past Surgical History:   Procedure Laterality Date    HX ENDOSCOPY      HX ORTHOPAEDIC Left 2008    leg fx. Family History:  History reviewed. No pertinent family history. Social History:  Social History     Tobacco Use    Smoking status: Never Smoker    Smokeless tobacco: Never Used   Substance Use Topics    Alcohol use: Yes     Comment: occassionally    Drug use: No       Allergies:  No Known Allergies      Review of Systems   Review of Systems   Constitutional: Negative for fever. Neurological: Positive for seizures. Negative for dizziness, speech difficulty, weakness, light-headedness, numbness and headaches. All Other Systems Negative  Physical Exam     Vitals:    02/11/20 0407   BP: 130/78   Pulse: 100   Resp: 16   Temp: 98.7 °F (37.1 °C)   SpO2: 96%   Weight: 90.7 kg (200 lb)     Physical Exam  Vitals signs and nursing note reviewed. Constitutional:       General: She is not in acute distress. Appearance: Normal appearance. She is well-developed and normal weight. She is not ill-appearing or toxic-appearing. HENT:      Head: Normocephalic and atraumatic. Eyes:      Pupils: Pupils are equal, round, and reactive to light. Comments: No nystagmus. Neck:      Thyroid: No thyromegaly. Vascular: No JVD. Trachea: No tracheal deviation. Cardiovascular:      Rate and Rhythm: Normal rate and regular rhythm. Heart sounds: Normal heart sounds. No murmur. No friction rub. No gallop. Pulmonary:      Effort: Pulmonary effort is normal. No respiratory distress. Breath sounds: Normal breath sounds. No stridor. No wheezing or rales. Chest:      Chest wall: No tenderness. Abdominal:      General: There is no distension. Palpations: Abdomen is soft. There is no mass. Tenderness: There is no abdominal tenderness. There is no guarding or rebound. Musculoskeletal:         General: No tenderness.    Lymphadenopathy: Cervical: No cervical adenopathy. Skin:     General: Skin is warm and dry. Coloration: Skin is not pale. Findings: No erythema or rash. Neurological:      Mental Status: She is alert and oriented to person, place, and time. Mental status is at baseline. Cranial Nerves: No cranial nerve deficit. Coordination: Coordination abnormal.      Comments: Negative Romberg. No dysdiadochokinesis past-pointing. She has an intention tremor on the left. Normal heel shin. Psychiatric:         Behavior: Behavior normal.         Thought Content: Thought content normal.            Diagnostic Study Results     Labs -     Recent Results (from the past 12 hour(s))   METABOLIC PANEL, COMPREHENSIVE    Collection Time: 02/11/20  3:35 AM   Result Value Ref Range    Sodium 141 136 - 145 mmol/L    Potassium 3.4 (L) 3.5 - 5.5 mmol/L    Chloride 107 100 - 111 mmol/L    CO2 21 21 - 32 mmol/L    Anion gap 13 3.0 - 18 mmol/L    Glucose 141 (H) 74 - 99 mg/dL    BUN 5 (L) 7.0 - 18 MG/DL    Creatinine 0.76 0.6 - 1.3 MG/DL    BUN/Creatinine ratio 7 (L) 12 - 20      GFR est AA >60 >60 ml/min/1.73m2    GFR est non-AA >60 >60 ml/min/1.73m2    Calcium 8.9 8.5 - 10.1 MG/DL    Bilirubin, total 0.6 0.2 - 1.0 MG/DL    ALT (SGPT) 22 13 - 56 U/L    AST (SGOT) 15 10 - 38 U/L    Alk.  phosphatase 140 (H) 45 - 117 U/L    Protein, total 7.3 6.4 - 8.2 g/dL    Albumin 3.7 3.4 - 5.0 g/dL    Globulin 3.6 2.0 - 4.0 g/dL    A-G Ratio 1.0 0.8 - 1.7     CBC WITH AUTOMATED DIFF    Collection Time: 02/11/20  3:35 AM   Result Value Ref Range    WBC 7.8 4.6 - 13.2 K/uL    RBC 5.10 4.20 - 5.30 M/uL    HGB 13.2 12.0 - 16.0 g/dL    HCT 41.1 35.0 - 45.0 %    MCV 80.6 74.0 - 97.0 FL    MCH 25.9 24.0 - 34.0 PG    MCHC 32.1 31.0 - 37.0 g/dL    RDW 14.6 (H) 11.6 - 14.5 %    PLATELET 188 668 - 888 K/uL    MPV 10.3 9.2 - 11.8 FL    NEUTROPHILS 29 (L) 40 - 73 %    LYMPHOCYTES 59 (H) 21 - 52 %    MONOCYTES 10 3 - 10 %    EOSINOPHILS 2 0 - 5 %    BASOPHILS 0 0 - 2 %    ABS. NEUTROPHILS 2.3 1.8 - 8.0 K/UL    ABS. LYMPHOCYTES 4.6 (H) 0.9 - 3.6 K/UL    ABS. MONOCYTES 0.8 0.05 - 1.2 K/UL    ABS. EOSINOPHILS 0.2 0.0 - 0.4 K/UL    ABS. BASOPHILS 0.0 0.0 - 0.1 K/UL    DF AUTOMATED     URINALYSIS W/ RFLX MICROSCOPIC    Collection Time: 02/11/20  5:00 AM   Result Value Ref Range    Color YELLOW      Appearance CLEAR      Specific gravity 1.013 1.005 - 1.030      pH (UA) 5.0 5.0 - 8.0      Protein NEGATIVE  NEG mg/dL    Glucose NEGATIVE  NEG mg/dL    Ketone NEGATIVE  NEG mg/dL    Bilirubin NEGATIVE  NEG      Blood NEGATIVE  NEG      Urobilinogen 0.2 0.2 - 1.0 EU/dL    Nitrites NEGATIVE  NEG      Leukocyte Esterase MODERATE (A) NEG     URINE MICROSCOPIC ONLY    Collection Time: 02/11/20  5:00 AM   Result Value Ref Range    WBC 8 to 12 0 - 4 /hpf    RBC NONE 0 - 5 /hpf    Epithelial cells FEW 0 - 5 /lpf    Bacteria 1+ (A) NEG /hpf    Mucus 1+ (A) NEG /lpf       Radiologic Studies -   No orders to display     CT Results  (Last 48 hours)    None        CXR Results  (Last 48 hours)    None            Medical Decision Making   I am the first provider for this patient. I reviewed the vital signs, available nursing notes, past medical history, past surgical history, family history and social history. Vital Signs-Reviewed the patient's vital signs. Records Reviewed: Nursing Notes and Old Medical Records    Procedures:  Procedures    Provider Notes (Medical Decision Making): second seizure, witnessed, now at baseline. Has appt with neurology this morning. MED RECONCILIATION:  No current facility-administered medications for this encounter. Current Outpatient Medications   Medication Sig    GABAPENTIN PO Take  by mouth.  acetaminophen with codeine (TYLENOL-CODEINE #3 PO) Take  by mouth.  aspirin 81 mg chewable tablet Take 81 mg by mouth daily.  meloxicam (MOBIC) 15 mg tablet Take 1 Tab by mouth daily.  Indications: joint damage causing pain and loss of function    methylPREDNISolone (MEDROL, AMY,) 4 mg tablet Per dose pack instructions    simvastatin (ZOCOR) 20 mg tablet Take 20 mg by mouth nightly.  multivitamin (ONE A DAY) tablet Take 1 tablet by mouth daily.  DOCOSAHEXANOIC ACID/EPA (FISH OIL PO) Take  by mouth daily. Disposition:  home    DISCHARGE NOTE:   6:34 AM    Pt has been reexamined. Patient has no new complaints, changes, or physical findings. Care plan outlined and precautions discussed. Results of labs were reviewed with the patient. All medications were reviewed with the patient. All of pt's questions and concerns were addressed. Patient was instructed and agrees to follow up with neurology, as well as to return to the ED upon further deterioration. Patient is ready to go home. Follow-up Information     Follow up With Specialties Details Why Beulah Cobb 1, [facility]  Go on 2/11/2020 keep your appointment this morning Alyssa  44.  [place] [address] [facility] EMERGENCY DEPT Emergency Medicine  If symptoms worsen return immediately [address]  [phone]          Current Discharge Medication List            Diagnosis     Clinical Impression:   1.  Seizure ([address]

## 2020-02-11 NOTE — ED TRIAGE NOTES
Arrived via Medic, patient awake, maintaining own airway, aggressive verbally,  And physically, in 4 point restraints, UE and LE soft restraint. on arrival per medics due to aggressive outbursts. Transferred from medic stretcher to ED stretcher with assit from 3 staff, x2 Security officers, and 2 Medics. Adult son, with patient, providing support.

## 2020-02-12 LAB
BACTERIA SPEC CULT: NORMAL
SERVICE CMNT-IMP: NORMAL

## 2020-03-13 ENCOUNTER — HOSPITAL ENCOUNTER (EMERGENCY)
Age: 55
Discharge: HOME | End: 2020-03-13
Attending: EMERGENCY MEDICINE
Payer: MEDICAID

## 2020-03-13 ENCOUNTER — APPOINTMENT (OUTPATIENT)
Dept: CT IMAGING | Age: 55
End: 2020-03-13
Attending: EMERGENCY MEDICINE
Payer: MEDICAID

## 2020-03-13 VITALS
TEMPERATURE: 97.4 F | BODY MASS INDEX: 27.26 KG/M2 | OXYGEN SATURATION: 99 % | DIASTOLIC BLOOD PRESSURE: 73 MMHG | SYSTOLIC BLOOD PRESSURE: 121 MMHG | WEIGHT: 190 LBS | HEART RATE: 68 BPM | RESPIRATION RATE: 18 BRPM

## 2020-03-13 DIAGNOSIS — G40.909 RECURRENT SEIZURES (HCC): Primary | ICD-10-CM

## 2020-03-13 LAB
ALBUMIN SERPL-MCNC: 3.5 G/DL (ref 3.4–5)
ALBUMIN/GLOB SERPL: 0.9 {RATIO} (ref 0.8–1.7)
ALP SERPL-CCNC: 131 U/L (ref 45–117)
ALT SERPL-CCNC: 28 U/L (ref 13–56)
ANION GAP SERPL CALC-SCNC: 7 MMOL/L (ref 3–18)
APPEARANCE UR: CLEAR
AST SERPL-CCNC: 12 U/L (ref 10–38)
BACTERIA URNS QL MICRO: NEGATIVE /HPF
BASOPHILS # BLD: 0 K/UL (ref 0–0.1)
BASOPHILS NFR BLD: 0 % (ref 0–2)
BILIRUB SERPL-MCNC: 0.5 MG/DL (ref 0.2–1)
BILIRUB UR QL: NEGATIVE
BUN SERPL-MCNC: 6 MG/DL (ref 7–18)
BUN/CREAT SERPL: 9 (ref 12–20)
CALCIUM SERPL-MCNC: 8.7 MG/DL (ref 8.5–10.1)
CHLORIDE SERPL-SCNC: 110 MMOL/L (ref 100–111)
CO2 SERPL-SCNC: 24 MMOL/L (ref 21–32)
COLOR UR: YELLOW
CREAT SERPL-MCNC: 0.66 MG/DL (ref 0.6–1.3)
DIFFERENTIAL METHOD BLD: ABNORMAL
EOSINOPHIL # BLD: 0.1 K/UL (ref 0–0.4)
EOSINOPHIL NFR BLD: 2 % (ref 0–5)
EPITH CASTS URNS QL MICRO: ABNORMAL /LPF (ref 0–5)
ERYTHROCYTE [DISTWIDTH] IN BLOOD BY AUTOMATED COUNT: 14.9 % (ref 11.6–14.5)
GLOBULIN SER CALC-MCNC: 3.8 G/DL (ref 2–4)
GLUCOSE SERPL-MCNC: 129 MG/DL (ref 74–99)
GLUCOSE UR STRIP.AUTO-MCNC: NEGATIVE MG/DL
HCT VFR BLD AUTO: 39.7 % (ref 35–45)
HGB BLD-MCNC: 12.9 G/DL (ref 12–16)
HGB UR QL STRIP: NEGATIVE
KETONES UR QL STRIP.AUTO: NEGATIVE MG/DL
LEUKOCYTE ESTERASE UR QL STRIP.AUTO: ABNORMAL
LYMPHOCYTES # BLD: 2.7 K/UL (ref 0.9–3.6)
LYMPHOCYTES NFR BLD: 40 % (ref 21–52)
MAGNESIUM SERPL-MCNC: 2.2 MG/DL (ref 1.6–2.6)
MCH RBC QN AUTO: 26.3 PG (ref 24–34)
MCHC RBC AUTO-ENTMCNC: 32.5 G/DL (ref 31–37)
MCV RBC AUTO: 80.9 FL (ref 74–97)
MONOCYTES # BLD: 0.8 K/UL (ref 0.05–1.2)
MONOCYTES NFR BLD: 11 % (ref 3–10)
MUCOUS THREADS URNS QL MICRO: ABNORMAL /LPF
NEUTS SEG # BLD: 3.2 K/UL (ref 1.8–8)
NEUTS SEG NFR BLD: 47 % (ref 40–73)
NITRITE UR QL STRIP.AUTO: NEGATIVE
PH UR STRIP: 6 [PH] (ref 5–8)
PLATELET # BLD AUTO: 307 K/UL (ref 135–420)
PMV BLD AUTO: 9.5 FL (ref 9.2–11.8)
POTASSIUM SERPL-SCNC: 3.5 MMOL/L (ref 3.5–5.5)
PROT SERPL-MCNC: 7.3 G/DL (ref 6.4–8.2)
PROT UR STRIP-MCNC: NEGATIVE MG/DL
RBC # BLD AUTO: 4.91 M/UL (ref 4.2–5.3)
RBC #/AREA URNS HPF: ABNORMAL /HPF (ref 0–5)
SODIUM SERPL-SCNC: 141 MMOL/L (ref 136–145)
SP GR UR REFRACTOMETRY: 1.01 (ref 1–1.03)
UROBILINOGEN UR QL STRIP.AUTO: 1 EU/DL (ref 0.2–1)
WBC # BLD AUTO: 6.8 K/UL (ref 4.6–13.2)
WBC URNS QL MICRO: ABNORMAL /HPF (ref 0–4)

## 2020-03-13 PROCEDURE — 96374 THER/PROPH/DIAG INJ IV PUSH: CPT

## 2020-03-13 PROCEDURE — 74011250636 HC RX REV CODE- 250/636: Performed by: EMERGENCY MEDICINE

## 2020-03-13 PROCEDURE — 85025 COMPLETE CBC W/AUTO DIFF WBC: CPT

## 2020-03-13 PROCEDURE — 83735 ASSAY OF MAGNESIUM: CPT

## 2020-03-13 PROCEDURE — 99284 EMERGENCY DEPT VISIT MOD MDM: CPT

## 2020-03-13 PROCEDURE — 81001 URINALYSIS AUTO W/SCOPE: CPT

## 2020-03-13 PROCEDURE — 70450 CT HEAD/BRAIN W/O DYE: CPT

## 2020-03-13 PROCEDURE — 80053 COMPREHEN METABOLIC PANEL: CPT

## 2020-03-13 RX ORDER — LEVETIRACETAM 10 MG/ML
1000 INJECTION INTRAVASCULAR ONCE
Status: COMPLETED | OUTPATIENT
Start: 2020-03-13 | End: 2020-03-13

## 2020-03-13 RX ORDER — LEVETIRACETAM 500 MG/1
500 TABLET ORAL 2 TIMES DAILY
Qty: 60 TAB | Refills: 0 | Status: SHIPPED | OUTPATIENT
Start: 2020-03-13 | End: 2020-04-12

## 2020-03-13 RX ADMIN — LEVETIRACETAM 1000 MG: 1000 INJECTION, SOLUTION INTRAVENOUS at 01:37

## 2020-03-13 NOTE — ED PROVIDER NOTES
EMERGENCY DEPARTMENT HISTORY AND PHYSICAL EXAM    3:10 AM  Date: 3/13/2020  Patient Name: Gabriel Vigil    History of Presenting Illness     Chief Complaint   Patient presents with    Seizure        History Provided By: Patient    HPI: Gabriel Vigil is a 53 y.o. female with history of seizure disorder. Patient was brought in by ambulance after a possible seizure episode. Witnessed by her son. Patient was seen a couple of times for seizures and was recommended to follow-up with neurology but she never went. Not taking any medication for seizure. Had 2 seizures last month and had a work-up done for it. Patient denies any symptoms currently. Location:  Severity:  Timing/course: Onset/Duration:     PCP: Abhishek To MD    Past History     Past Medical History:  Past Medical History:   Diagnosis Date    GERD (gastroesophageal reflux disease)     Seizures ([address]        Past Surgical History:  Past Surgical History:   Procedure Laterality Date    HX ENDOSCOPY      HX ORTHOPAEDIC Left 2008    leg fx. Family History:  History reviewed. No pertinent family history. Social History:  Social History     Tobacco Use    Smoking status: Never Smoker    Smokeless tobacco: Never Used   Substance Use Topics    Alcohol use: Yes     Comment: occassionally    Drug use: No       Allergies:  No Known Allergies    Review of Systems   Review of Systems   Neurological: Positive for seizures. All other systems reviewed and are negative. Physical Exam     Patient Vitals for the past 12 hrs:   Temp Pulse Resp BP SpO2   03/13/20 0200   12 122/73 97 %   03/13/20 0130 98 °F (36.7 °C) 84 10 128/69 97 %   03/13/20 0115   20 136/72 97 %       Physical Exam  Vitals signs and nursing note reviewed. Constitutional:       Appearance: Normal appearance. HENT:      Head: Normocephalic and atraumatic.       Nose: Nose normal.      Mouth/Throat:      Mouth: Mucous membranes are moist.   Eyes:      Extraocular Movements: Extraocular movements intact. Neck:      Musculoskeletal: Neck supple. Cardiovascular:      Rate and Rhythm: Normal rate. Pulmonary:      Effort: Pulmonary effort is normal. No respiratory distress. Musculoskeletal: Normal range of motion. General: No deformity. Skin:     General: Skin is warm and dry. Neurological:      General: No focal deficit present. Mental Status: She is alert and oriented to person, place, and time. Cranial Nerves: No cranial nerve deficit. Psychiatric:         Mood and Affect: Mood normal.         Behavior: Behavior normal.         Diagnostic Study Results     Labs -  Recent Results (from the past 12 hour(s))   CBC WITH AUTOMATED DIFF    Collection Time: 03/13/20  1:20 AM   Result Value Ref Range    WBC 6.8 4.6 - 13.2 K/uL    RBC 4.91 4.20 - 5.30 M/uL    HGB 12.9 12.0 - 16.0 g/dL    HCT 39.7 35.0 - 45.0 %    MCV 80.9 74.0 - 97.0 FL    MCH 26.3 24.0 - 34.0 PG    MCHC 32.5 31.0 - 37.0 g/dL    RDW 14.9 (H) 11.6 - 14.5 %    PLATELET 263 431 - 116 K/uL    MPV 9.5 9.2 - 11.8 FL    NEUTROPHILS 47 40 - 73 %    LYMPHOCYTES 40 21 - 52 %    MONOCYTES 11 (H) 3 - 10 %    EOSINOPHILS 2 0 - 5 %    BASOPHILS 0 0 - 2 %    ABS. NEUTROPHILS 3.2 1.8 - 8.0 K/UL    ABS. LYMPHOCYTES 2.7 0.9 - 3.6 K/UL    ABS. MONOCYTES 0.8 0.05 - 1.2 K/UL    ABS. EOSINOPHILS 0.1 0.0 - 0.4 K/UL    ABS.  BASOPHILS 0.0 0.0 - 0.1 K/UL    DF AUTOMATED     METABOLIC PANEL, COMPREHENSIVE    Collection Time: 03/13/20  1:20 AM   Result Value Ref Range    Sodium 141 136 - 145 mmol/L    Potassium 3.5 3.5 - 5.5 mmol/L    Chloride 110 100 - 111 mmol/L    CO2 24 21 - 32 mmol/L    Anion gap 7 3.0 - 18 mmol/L    Glucose 129 (H) 74 - 99 mg/dL    BUN 6 (L) 7.0 - 18 MG/DL    Creatinine 0.66 0.6 - 1.3 MG/DL    BUN/Creatinine ratio 9 (L) 12 - 20      GFR est AA >60 >60 ml/min/1.73m2    GFR est non-AA >60 >60 ml/min/1.73m2    Calcium 8.7 8.5 - 10.1 MG/DL    Bilirubin, total 0.5 0.2 - 1.0 MG/DL    ALT (SGPT) 28 13 - 56 U/L    AST (SGOT) 12 10 - 38 U/L    Alk. phosphatase 131 (H) 45 - 117 U/L    Protein, total 7.3 6.4 - 8.2 g/dL    Albumin 3.5 3.4 - 5.0 g/dL    Globulin 3.8 2.0 - 4.0 g/dL    A-G Ratio 0.9 0.8 - 1.7     MAGNESIUM    Collection Time: 03/13/20  1:20 AM   Result Value Ref Range    Magnesium 2.2 1.6 - 2.6 mg/dL       Radiologic Studies -   No results found. Medical Decision Making     ED Course: Progress Notes, Reevaluation, and Consults:    3:10 AM Initial assessment performed. The patients presenting problems have been discussed, and they/their family are in agreement with the care plan formulated and outlined with them. I have encouraged them to ask questions as they arise throughout their visit. Provider Notes (Medical Decision Making): 58-year-old female presenting after seizure activity. She had 2 seizures last month for the first time and was worked up but never followed up with neurology and never took any medications for it. Is well-appearing on exam with no physical complaints. He was initially confused but is more alert and awake now. No tongue fasciculation. Will get screening labs and load her with Keppra. I will stressed to the patient and her son that she needs to follow-up with neurology and she absolutely should not be driving until her seizures are under control until she is cleared by neurology. I told the patient that she could put her life and other lives at risk if she had a seizure while driving. After chart review I noticed that the patient never had a head CT here so I ordered a head CT    Procedures:     Critical Care Time:     Vital Signs-Reviewed the patient's vital signs. Reviewed pt's pulse ox reading. EKG: Interpreted by the EP.    Time Interpreted:    Rate:    Rhythm:    Interpretation:   Comparison:     Records Reviewed: Nursing Notes (Time of Review: 3:10 AM)  -I am the first provider for this patient.  -I reviewed the vital signs, available nursing notes, past medical history, past surgical history, family history and social history. Current Outpatient Medications   Medication Sig Dispense Refill    GABAPENTIN PO Take  by mouth.  acetaminophen with codeine (TYLENOL-CODEINE #3 PO) Take  by mouth.  aspirin 81 mg chewable tablet Take 81 mg by mouth daily.  meloxicam (MOBIC) 15 mg tablet Take 1 Tab by mouth daily. Indications: joint damage causing pain and loss of function 30 Tab 1    methylPREDNISolone (MEDROL, AMY,) 4 mg tablet Per dose pack instructions 1 Dose Pack 0    simvastatin (ZOCOR) 20 mg tablet Take 20 mg by mouth nightly.  multivitamin (ONE A DAY) tablet Take 1 tablet by mouth daily.  DOCOSAHEXANOIC ACID/EPA (FISH OIL PO) Take  by mouth daily. Clinical Impression     Clinical Impression: No diagnosis found. Disposition: DC    This note was dictated utilizing voice recognition software which may lead to typographical errors. I apologize in advance if the situation occurs. If questions arise please do not hesitate to contact me or call our department.     Ryan Santos MD  3:10 AM

## 2020-03-13 NOTE — ED NOTES
I have reviewed discharge instructions with the patient. The patient verbalized understanding. Patient looks comfortable, left ED in stable condition. No seizure activity noted during duration of visit. Ambulatory, steady gait noted.

## 2020-03-13 NOTE — DISCHARGE INSTRUCTIONS
Patient Education        Epilepsy: Care Instructions  Your Care Instructions    Epilepsy is a common condition that causes repeated seizures. The seizures are caused by bursts of electrical activity in the brain that aren't normal. Seizures may cause problems with muscle control, movement, speech, vision, or awareness. They can be scary. Epilepsy affects each person differently. Some people have only a few seizures. Others get them more often. If you know what triggers a seizure, you may be able to avoid having one. You can take medicines to control and reduce seizures. You and your doctor will need to find the right combination, schedule, and dose of medicine. This may take time and careful changes. Seizures may get worse and happen more often over time. Follow-up care is a key part of your treatment and safety. Be sure to make and go to all appointments, and call your doctor if you are having problems. It's also a good idea to know your test results and keep a list of the medicines you take. How can you care for yourself at home? · Be safe with medicines. Take your medicines exactly as prescribed. Call your doctor if you think you are having a problem with your medicine. · Make a treatment plan with your doctor. Be sure to follow your plan. · Try to identify and avoid things that may make you more likely to have a seizure. These may include:  ? Not getting enough sleep. ? Using drugs or alcohol. ? Being emotionally stressed. ? Skipping meals. · Keep a record of any seizures you have. Note the date, time of day, and any details about the seizure that you can remember. Your doctor can use this information to plan or adjust your medicine or other treatment. · Be sure that any doctor treating you for another condition knows that you have epilepsy. Each doctor should know what medicines you are taking, if any. · Wear a medical ID bracelet. You can buy this at most [employer].  If you have a seizure that leaves you unconscious or unable to speak for yourself, this bracelet will let those who are treating you know that you have epilepsy. · Talk to your doctor about whether it is safe for you to do certain activities, such as drive or swim. When should you call for help? Call 911 anytime you think you may need emergency care. For example, call if:    · A seizure does not stop as it normally does.     · You have new symptoms such as:  ? Numbness, tingling, or weakness on one side of your body or face. ? Vision changes. ? Trouble speaking or thinking clearly.    Call your doctor now or seek immediate medical care if:    · You have a fever.     · You have a severe headache.    Watch closely for changes in your health, and be sure to contact your doctor if:    · The normal pattern or features of your seizures change. Where can you learn more? Go to http://bertha-nidia.info/  Enter X141 in the search box to learn more about \"Epilepsy: Care Instructions. \"  Current as of: November 19, 2019Content Version: 12.4  © 5677-5989 [employer]. Care instructions adapted under license by [employer] (which disclaims liability or warranty for this information). If you have questions about a medical condition or this instruction, always ask your healthcare professional. [place] [address] any warranty or liability for your use of this information.

## 2020-03-13 NOTE — ED TRIAGE NOTES
Presents via medic after witnessed seizure. Pt postictal on arrival.  Medics report family does not know what seizure meds pt is on. Pt unable to state what if any medications she takes. Prior encounters for seizures noted in pt chart.   VSS

## 2021-02-17 ENCOUNTER — HOSPITAL ENCOUNTER (EMERGENCY)
Age: 56
Discharge: HOME | End: 2021-02-17
Attending: EMERGENCY MEDICINE
Payer: MEDICAID

## 2021-02-17 VITALS
DIASTOLIC BLOOD PRESSURE: 78 MMHG | HEART RATE: 75 BPM | SYSTOLIC BLOOD PRESSURE: 128 MMHG | TEMPERATURE: 98.4 F | OXYGEN SATURATION: 99 % | RESPIRATION RATE: 16 BRPM

## 2021-02-17 DIAGNOSIS — E87.6 HYPOKALEMIA: ICD-10-CM

## 2021-02-17 DIAGNOSIS — G40.909 RECURRENT SEIZURES (HCC): Primary | ICD-10-CM

## 2021-02-17 LAB
ALBUMIN SERPL-MCNC: 3.5 G/DL (ref 3.4–5)
ALBUMIN/GLOB SERPL: 0.9 {RATIO} (ref 0.8–1.7)
ALP SERPL-CCNC: 152 U/L (ref 45–117)
ALT SERPL-CCNC: 23 U/L (ref 13–56)
ANION GAP SERPL CALC-SCNC: 8 MMOL/L (ref 3–18)
AST SERPL-CCNC: 14 U/L (ref 10–38)
BASOPHILS # BLD: 0 K/UL (ref 0–0.1)
BASOPHILS NFR BLD: 0 % (ref 0–2)
BILIRUB SERPL-MCNC: 0.4 MG/DL (ref 0.2–1)
BUN SERPL-MCNC: 6 MG/DL (ref 7–18)
BUN/CREAT SERPL: 10 (ref 12–20)
CALCIUM SERPL-MCNC: 8.2 MG/DL (ref 8.5–10.1)
CHLORIDE SERPL-SCNC: 108 MMOL/L (ref 100–111)
CO2 SERPL-SCNC: 28 MMOL/L (ref 21–32)
CREAT SERPL-MCNC: 0.59 MG/DL (ref 0.6–1.3)
DIFFERENTIAL METHOD BLD: ABNORMAL
EOSINOPHIL # BLD: 0.1 K/UL (ref 0–0.4)
EOSINOPHIL NFR BLD: 1 % (ref 0–5)
ERYTHROCYTE [DISTWIDTH] IN BLOOD BY AUTOMATED COUNT: 14.6 % (ref 11.6–14.5)
GLOBULIN SER CALC-MCNC: 4 G/DL (ref 2–4)
GLUCOSE BLD STRIP.AUTO-MCNC: 149 MG/DL (ref 70–110)
GLUCOSE SERPL-MCNC: 127 MG/DL (ref 74–99)
HCT VFR BLD AUTO: 40.3 % (ref 35–45)
HGB BLD-MCNC: 13.2 G/DL (ref 12–16)
LYMPHOCYTES # BLD: 1.4 K/UL (ref 0.9–3.6)
LYMPHOCYTES NFR BLD: 31 % (ref 21–52)
MAGNESIUM SERPL-MCNC: 2.2 MG/DL (ref 1.6–2.6)
MCH RBC QN AUTO: 26.7 PG (ref 24–34)
MCHC RBC AUTO-ENTMCNC: 32.8 G/DL (ref 31–37)
MCV RBC AUTO: 81.6 FL (ref 74–97)
MONOCYTES # BLD: 0.5 K/UL (ref 0.05–1.2)
MONOCYTES NFR BLD: 11 % (ref 3–10)
NEUTS SEG # BLD: 2.6 K/UL (ref 1.8–8)
NEUTS SEG NFR BLD: 57 % (ref 40–73)
PLATELET # BLD AUTO: 291 K/UL (ref 135–420)
PMV BLD AUTO: 9.6 FL (ref 9.2–11.8)
POTASSIUM SERPL-SCNC: 3.1 MMOL/L (ref 3.5–5.5)
PROT SERPL-MCNC: 7.5 G/DL (ref 6.4–8.2)
RBC # BLD AUTO: 4.94 M/UL (ref 4.2–5.3)
SODIUM SERPL-SCNC: 144 MMOL/L (ref 136–145)
WBC # BLD AUTO: 4.5 K/UL (ref 4.6–13.2)

## 2021-02-17 PROCEDURE — 80183 DRUG SCRN QUANT OXCARBAZEPIN: CPT

## 2021-02-17 PROCEDURE — 82962 GLUCOSE BLOOD TEST: CPT

## 2021-02-17 PROCEDURE — 85025 COMPLETE CBC W/AUTO DIFF WBC: CPT

## 2021-02-17 PROCEDURE — 99285 EMERGENCY DEPT VISIT HI MDM: CPT

## 2021-02-17 PROCEDURE — 80053 COMPREHEN METABOLIC PANEL: CPT

## 2021-02-17 PROCEDURE — 74011250637 HC RX REV CODE- 250/637: Performed by: EMERGENCY MEDICINE

## 2021-02-17 PROCEDURE — 83735 ASSAY OF MAGNESIUM: CPT

## 2021-02-17 RX ORDER — MAGNESIUM SULFATE HEPTAHYDRATE 40 MG/ML
2 INJECTION, SOLUTION INTRAVENOUS ONCE
Status: DISCONTINUED | OUTPATIENT
Start: 2021-02-17 | End: 2021-02-17

## 2021-02-17 RX ORDER — POTASSIUM CHLORIDE 20 MEQ/1
40 TABLET, EXTENDED RELEASE ORAL
Status: COMPLETED | OUTPATIENT
Start: 2021-02-17 | End: 2021-02-17

## 2021-02-17 RX ADMIN — POTASSIUM CHLORIDE 40 MEQ: 1500 TABLET, EXTENDED RELEASE ORAL at 09:45

## 2021-02-17 NOTE — ED NOTES
Bedside shift change report given to Aldair RN (oncoming nurse) by David Morris RN (offgoing nurse). Report included the following information SBAR, ED Summary and MAR.

## 2021-02-17 NOTE — ED TRIAGE NOTES
Pt arrived via EMS from home post seizure. Pts family states they usually give her a little pill that helps her come around but they were unable to find it.  Pt is A&O x3

## 2021-02-17 NOTE — ED PROVIDER NOTES
EMERGENCY DEPARTMENT HISTORY AND PHYSICAL EXAM  This was created with voice recognition software and transcription errors may be present. 7:19 AM  Date: 2/17/2021  Patient Name: Derrick Hall    History of Presenting Illness     Chief Complaint:    History Provided By:     HPI: Derrick Hall is a 63 y.o. female past medical history of seizures and reflux who presents status post seizure. Per history family states patient had a seizure. Patient does not remember anything. She states her last seizure she went to [facility] with her sister as per chart review the only seizure I could find over at [facility] was from 2019. She does not quite remember what she takes or who her neurologist is. Her chart reveals appears to be Dr. Sloan Murguia. She is on Trileptal 150 mg 2 tabs twice daily stop date of 12/16/2020 patient is now awake and alert she has no complaints    PCP: Emery Trujillo MD      Past History     Past Medical History:  Past Medical History:   Diagnosis Date    GERD (gastroesophageal reflux disease)     Seizures ([address]        Past Surgical History:  Past Surgical History:   Procedure Laterality Date    HX ENDOSCOPY      HX ORTHOPAEDIC Left 2008    leg fx. Family History:  No family history on file. Social History:  Social History     Tobacco Use    Smoking status: Never Smoker    Smokeless tobacco: Never Used   Substance Use Topics    Alcohol use: Yes     Comment: occassionally    Drug use: No       Allergies:  No Known Allergies    Review of Systems     Review of Systems   Constitutional: Positive for diaphoresis. All other systems reviewed and are negative. 10 point review of systems otherwise negative unless noted in HPI. Physical Exam       Physical Exam  Constitutional:       Appearance: She is well-developed. HENT:      Head: Normocephalic and atraumatic. Eyes:      Pupils: Pupils are equal, round, and reactive to light.    Neck:      Musculoskeletal: Normal range of motion and neck supple. Cardiovascular:      Rate and Rhythm: Normal rate and regular rhythm. Heart sounds: Normal heart sounds. No murmur. No friction rub. Pulmonary:      Effort: Pulmonary effort is normal. No respiratory distress. Breath sounds: Normal breath sounds. No wheezing. Abdominal:      General: There is no distension. Palpations: Abdomen is soft. Tenderness: There is no abdominal tenderness. There is no guarding or rebound. Musculoskeletal: Normal range of motion. Skin:     General: Skin is warm and dry. Neurological:      Mental Status: She is alert and oriented to person, place, and time. Psychiatric:         Behavior: Behavior normal.         Thought Content: Thought content normal.         Diagnostic Study Results     Vital Signs   Visit Vitals  /84   Pulse (!) 102   Temp 98.2 °F (36.8 °C)   Resp 17   SpO2 98%      EKG:  Labs: cbc wnl  Mild hypo k  Imaging: none    Medical Decision Making     ED Course: Progress Notes, Reevaluation, and Consults:      Provider Notes (Medical Decision Making): Las wnl; mild hypo k; pt with recurrent sz; labs send out . No recent sz so no changes to meds. Diagnosis     Clinical Impression: No diagnosis found. Disposition:    Patient's Medications   Start Taking    No medications on file   Continue Taking    ACETAMINOPHEN WITH CODEINE (TYLENOL-CODEINE #3 PO)    Take  by mouth. ASPIRIN 81 MG CHEWABLE TABLET    Take 81 mg by mouth daily. DOCOSAHEXANOIC ACID/EPA (FISH OIL PO)    Take  by mouth daily. GABAPENTIN PO    Take  by mouth. MELOXICAM (MOBIC) 15 MG TABLET    Take 1 Tab by mouth daily. Indications: joint damage causing pain and loss of function    METHYLPREDNISOLONE (MEDROL, AMY,) 4 MG TABLET    Per dose pack instructions    MULTIVITAMIN (ONE A DAY) TABLET    Take 1 tablet by mouth daily. SIMVASTATIN (ZOCOR) 20 MG TABLET    Take 20 mg by mouth nightly.    These Medications have changed    No medications on file   Stop Taking    No medications on file

## 2021-02-19 LAB — OXCARBAZEPINE SERPL-MCNC: 3 UG/ML (ref 10–35)
